# Patient Record
Sex: MALE | Employment: FULL TIME | URBAN - METROPOLITAN AREA
[De-identification: names, ages, dates, MRNs, and addresses within clinical notes are randomized per-mention and may not be internally consistent; named-entity substitution may affect disease eponyms.]

---

## 2020-10-07 ENCOUNTER — OFFICE VISIT (OUTPATIENT)
Dept: ORTHOPEDIC SURGERY | Age: 30
End: 2020-10-07
Payer: OTHER GOVERNMENT

## 2020-10-07 VITALS
TEMPERATURE: 96.9 F | WEIGHT: 218 LBS | DIASTOLIC BLOOD PRESSURE: 67 MMHG | BODY MASS INDEX: 27.1 KG/M2 | OXYGEN SATURATION: 97 % | SYSTOLIC BLOOD PRESSURE: 118 MMHG | HEART RATE: 60 BPM | HEIGHT: 75 IN

## 2020-10-07 DIAGNOSIS — M79.18 MYOFASCIAL PAIN: ICD-10-CM

## 2020-10-07 DIAGNOSIS — M95.8 WINGING OF SCAPULA: ICD-10-CM

## 2020-10-07 DIAGNOSIS — M51.27 PROTRUSION OF INTERVERTEBRAL DISC OF LUMBOSACRAL REGION: ICD-10-CM

## 2020-10-07 DIAGNOSIS — G25.89 SCAPULAR DYSKINESIS: Primary | ICD-10-CM

## 2020-10-07 PROCEDURE — 99203 OFFICE O/P NEW LOW 30 MIN: CPT | Performed by: PHYSICAL MEDICINE & REHABILITATION

## 2020-10-07 RX ORDER — BACLOFEN 10 MG/1
TABLET ORAL
Qty: 30 TAB | Refills: 1 | Status: SHIPPED | OUTPATIENT
Start: 2020-10-07

## 2020-10-07 NOTE — PATIENT INSTRUCTIONS
Healthy Upper Back: Exercises Introduction Here are some examples of exercises for your upper back. Start each exercise slowly. Ease off the exercise if you start to have pain. Your doctor or physical therapist will tell you when you can start these exercises and which ones will work best for you. How to do the exercises Lower neck and upper back stretch 1. Stretch your arms out in front of your body. Clasp one hand on top of your other hand. 2. Gently reach out so that you feel your shoulder blades stretching away from each other. 3. Gently bend your head forward. 4. Hold for 15 to 30 seconds. 5. Repeat 2 to 4 times. Midback stretch If you have knee pain, do not do this exercise. 1. Kneel on the floor, and sit back on your ankles. 2. Lean forward, place your hands on the floor, and stretch your arms out in front of you. Rest your head between your arms. 3. Gently push your chest toward the floor, reaching as far in front of you as possible. 4. Hold for 15 to 30 seconds. 5. Repeat 2 to 4 times. Shoulder rolls 1. Sit comfortably with your feet shoulder-width apart. You can also do this exercise while standing. 2. Roll your shoulders up, then back, and then down in a smooth, circular motion. 3. Repeat 2 to 4 times. Wall push-up 1. Stand against a wall with your feet about 12 to 24 inches back from the wall. If you feel any pain when you do this exercise, stand closer to the wall. 2. Place your hands on the wall slightly wider apart than your shoulders, and lean forward. 3. Gently lean your body toward the wall. Then push back to your starting position. Keep the motion smooth and controlled. 4. Repeat 8 to 12 times. Resisted shoulder blade squeeze For this exercise, you will need elastic exercise material, such as surgical tubing or Thera-Band. 1. Sit or stand, holding the band in both hands in front of you.  Keep your elbows close to your sides, bent at a 90-degree angle. Your palms should face up. 2. Squeeze your shoulder blades together, and move your arms to the outside, stretching the band. Be sure to keep your elbows at your sides while you do this. 3. Relax. 4. Repeat 8 to 12 times. Resisted rows For this exercise, you will need elastic exercise material, such as surgical tubing or Thera-Band. 1. Put the band around a solid object, such as a bedpost, at about waist level. Hold one end of the band in each hand. 2. With your elbows at your sides and bent to 90 degrees, pull the band back to move your shoulder blades toward each other. Return to the starting position. 3. Repeat 8 to 12 times. Follow-up care is a key part of your treatment and safety. Be sure to make and go to all appointments, and call your doctor if you are having problems. It's also a good idea to know your test results and keep a list of the medicines you take. Where can you learn more? Go to http://www.gray.com/ Enter P158 in the search box to learn more about \"Healthy Upper Back: Exercises. \" Current as of: March 2, 2020               Content Version: 12.6 © 8236-2012 [employer]. Care instructions adapted under license by [employer] (which disclaims liability or warranty for this information). If you have questions about a medical condition or this instruction, always ask your healthcare professional. [place] [address] any warranty or liability for your use of this information.

## 2020-10-07 NOTE — PROGRESS NOTES
[address], [address]  [place], [facility] [address]  Phone: [phone]  Fax: [phone]        Juventino Guadalupe  : 1990  PCP: Rocco Flores MD      NEW PATIENT      ASSESSMENT AND PLAN     Diagnoses and all orders for this visit:    1. Scapular dyskinesis  -     REFERRAL TO PAIN MANAGEMENT  -     baclofen (LIORESAL) 10 mg tablet; Take 1/2-1 tab QHS prn pain    2. Winging of scapula, right    3. Myofascial pain  -     REFERRAL TO PAIN MANAGEMENT  -     baclofen (LIORESAL) 10 mg tablet; Take 1/2-1 tab QHS prn pain    4. Protrusion of intervertebral disc of lumbosacral region, right      1. 31 y.o. 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 Myofascial overlay. 2. Reassured patient about thoracic MRI findings. Multiple studies suggest no rib fx. Needs extended course of PT, trial of Russian stim  3. No indication for spine injections or sx. 4. Referred to Dr. Julián La for acupuncture to decrease pain and muscular inhibition  5. Trial of Baclofen QHS PRN  6. Given information on upper back exercises      Follow-up and Dispositions    · Return if symptoms worsen or fail to improve. CHIEF COMPLAINT  Larry Rivas is seen today in consultation for complaints of R sided back pain       HISTORY OF PRESENT ILLNESS  Larry Rivas is a 31 y.o. male, [employer]. Today pt c/o back pain of 2 yr duration. 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 Reports that both rotator cuff/ labrums tore as a result. Describes a partially ruptured achilles as well. However, his command told him to \"tough it out. \" Denies trying acupuncture or chiropractic treatment. Admits to intermittent RLE weakness. He reports that when he breathes deeply, he can feel his ribs \"crack. \" Rates pain in shoulder blade > back. Affirms insomnia due to discomfort and pain. He is concerned about on-going rib fractures. Reports that he and rad tech counted 6 rib fractures on recent xrays. Pain Assessment  10/7/2020   Location of Pain Neck;Back   Location Modifiers Right   Severity of Pain 4   Quality of Pain Aching   Frequency of Pain Constant     Does pain radiate into extremities: no  Does patient have numbness/tingling: No  Does patient have weakness: RLE intermittent  Pt denies saddle paresthesias. Medications pt is on: . Denies persistent fevers, chills, weight changes, neurogenic bowel or bladder symptoms. Treatments patient has tried:  Physical therapy:Yes. dry needling with temporary benefit, \"released muscles for 30 minutes only\"  Doing HEP: yes  Failed medications: avoids meds. , muscle relaxers make him \"foggy\" Not taking OTC meds  Spinal injections: No    Spinal surgery- No.   Thoracic sx consult: [facility]  2020, no sx recommended. Last T MRI 7/2020: hemangioma at T4, facet changes T5, T6, T10, T11  T MRI 1/2020 [facility]) unremarkable except T4 hemangioma  Last L MRI 7/2020: deg changes L3-5, R sided protrusion at L5-S1    CT chest 1/2020 [facility]):  No acute osseous findings, no evidence of chronic nonunited rib fx    CT chest 10/2019:  No evidence of rib fx    12/2018   Traumatic fall from boat onto a metal beam while working for [employer] in [facility], stationed there for 4 years, [place]. .\" Would need to be airlifted for treatment. He wasn't evaluated until about a year later when stationed in [place]. Jane Arnold He was dx w/ rotator cuff and labrum tears B/L, (sx for R shoulder 11/2019.), reports he couldn't complete full therapy due to his rib and back pain. Also reports  partially ruptured right Achilles tendon, left knee  Injury, and \"rib fractures. \". Had 10/2019 and 1/2020 ([place] and [facility]) x2 chest CT's negative for osseus lesions. PAST MEDICAL HISTORY   History reviewed.  No pertinent past medical history. Past Surgical History:   Procedure Laterality Date    [place] ANESTH,SURGERY OF SHOULDER      right labrum repair       MEDICATIONS        Controlled Substance Monitoring:    No flowsheet data found. ALLERGIES  No Known Allergies       SOCIAL HISTORY    Social History     Socioeconomic History    Marital status: SINGLE     Spouse name: Not on file    Number of children: Not on file    Years of education: Not on file    Highest education level: Not on file   Occupational History    Not on file   Social Needs    Financial resource strain: Not on file    Food insecurity     Worry: Not on file     Inability: Not on file    Transportation needs     Medical: Not on file     Non-medical: Not on file   Tobacco Use    Smoking status: Never Smoker    Smokeless tobacco: Never Used   Substance and Sexual Activity    Alcohol use: Never     Frequency: Never    Drug use: Never    Sexual activity: Not on file   Lifestyle    Physical activity     Days per week: Not on file     Minutes per session: Not on file    Stress: Not on file   Relationships    Social connections     Talks on phone: Not on file     Gets together: Not on file     Attends [employer] service: Not on file     Active member of club or organization: Not on file     Attends meetings of clubs or organizations: Not on file     Relationship status: Not on file    Intimate partner violence     Fear of current or ex partner: Not on file     Emotionally abused: Not on file     Physically abused: Not on file     Forced sexual activity: Not on file   Other Topics Concern    Not on file   Social History Narrative    Not on file       FAMILY HISTORY    Family History   Problem Relation Age of Onset    No Known Problems Mother     No Known Problems Father     No Known Problems Sister     No Known Problems Brother          REVIEW OF SYSTEMS  Review of Systems   Constitutional: Negative for chills, fever and weight loss.    Respiratory: Negative for shortness of breath. Cardiovascular: Negative for chest pain. Gastrointestinal: Negative for constipation. Negative for fecal incontinence   Genitourinary: Negative for dysuria. Negative for urinary incontinence   Musculoskeletal: Positive for back pain, joint pain and myalgias. Per HPI   Skin: Negative for rash. Neurological: Negative for dizziness, tingling, tremors, focal weakness and headaches. Endo/Heme/Allergies: Does not bruise/bleed easily. Psychiatric/Behavioral: The patient has insomnia. PHYSICAL EXAMINATION  Visit Vitals  /67 (BP 1 Location: Left arm, BP Patient Position: Sitting)   Pulse 60   Temp 96.9 °F (36.1 °C) (Oral)   Ht 6' 3\" (1.905 m)   Wt 218 lb (98.9 kg)   SpO2 97%   BMI 27.25 kg/m²          Accompanied by self. Constitutional:  Well developed, well nourished, athletic appearing male, in no acute distress. Psychiatric: Affect and mood are appropriate. Integumentary: No rashes or abrasions noted on exposed areas. Cardiovascular/Peripheral Vascular: No peripheral edema is noted BLE. SPINE/MUSCULOSKELETAL EXAM      Neck is midline. Noticeable atrophy of mid and upper trapezius. Asymmetry of his scapula with downward displacement on Rt. Mild winging of R scapula. No scapular prominence or rib hump with forward flexion. Full abduction of L shoulder, R shoulder abduction limited to 120 degrees due to pain. Tenderness to palpation R upper trap., right medial scapular border. Negative Spurling's sign. Negative Tinel's sign. Negative Wade's sign. No fasciculations. . Increased muscle tone from mid thoracic to upper lumbar paraspinals. Tenderness to palpation diffusely from mid thoracic to upper lumbar right paraspinal musculature w/mild palpation . No tenderness to palpation at the sciatic notch. SI joints non-tender. Trochanters non tender.        MOTOR:     Biceps Triceps Deltoids Wrist Ext Wrist Flex Hand Intrin Right 5/5 5/5 5/5 5/5 5/5 5/5   Left 5/5 5/5 5/5 5/5 5/5 5/5     DTRs are hypoactive biceps, triceps, brachioradialis, patella. No difficulty with tandem gait. Intact toe and heel walk. Intact single leg toe stance. Ambulation without assistive device. FWB. Written by Rj Valdez, as dictated by Yuridia Schwab MD.    I, Dr. Yuridia Schwab MD, confirm that all documentation is accurate. Mr. Merline Gills may have a reminder for a \"due or due soon\" health maintenance. I have asked that he contact his primary care provider for follow-up on this health maintenance.

## 2023-02-01 RX ORDER — BACLOFEN 10 MG/1
TABLET ORAL
COMMUNITY
Start: 2020-10-07